# Patient Record
Sex: MALE | Race: WHITE | NOT HISPANIC OR LATINO | URBAN - METROPOLITAN AREA
[De-identification: names, ages, dates, MRNs, and addresses within clinical notes are randomized per-mention and may not be internally consistent; named-entity substitution may affect disease eponyms.]

---

## 2022-01-05 ENCOUNTER — OUT OF OFFICE VISIT (OUTPATIENT)
Dept: URBAN - METROPOLITAN AREA MEDICAL CENTER 23 | Facility: MEDICAL CENTER | Age: 67
End: 2022-01-05
Payer: MEDICARE

## 2022-01-05 DIAGNOSIS — R13.19 CERVICAL DYSPHAGIA: ICD-10-CM

## 2022-01-05 DIAGNOSIS — I10 ACCELERATED ESSENTIAL HYPERTENSION: ICD-10-CM

## 2022-01-05 PROCEDURE — 99232 SBSQ HOSP IP/OBS MODERATE 35: CPT | Performed by: PHYSICIAN ASSISTANT

## 2022-01-05 PROCEDURE — 99222 1ST HOSP IP/OBS MODERATE 55: CPT | Performed by: PHYSICIAN ASSISTANT

## 2022-01-05 PROCEDURE — G8427 DOCREV CUR MEDS BY ELIG CLIN: HCPCS | Performed by: PHYSICIAN ASSISTANT

## 2022-01-07 ENCOUNTER — OUT OF OFFICE VISIT (OUTPATIENT)
Dept: URBAN - METROPOLITAN AREA MEDICAL CENTER 23 | Facility: MEDICAL CENTER | Age: 67
End: 2022-01-07
Payer: MEDICARE

## 2022-01-07 DIAGNOSIS — R13.19 CERVICAL DYSPHAGIA: ICD-10-CM

## 2022-01-07 DIAGNOSIS — K22.2 ACQUIRED ESOPHAGEAL RING: ICD-10-CM

## 2022-01-07 PROCEDURE — 43246 EGD PLACE GASTROSTOMY TUBE: CPT | Performed by: INTERNAL MEDICINE

## 2022-01-18 ENCOUNTER — OUT OF OFFICE VISIT (OUTPATIENT)
Dept: URBAN - METROPOLITAN AREA MEDICAL CENTER 23 | Facility: MEDICAL CENTER | Age: 67
End: 2022-01-18
Payer: MEDICARE

## 2022-01-18 DIAGNOSIS — R11.2 ACUTE NAUSEA WITH NONBILIOUS VOMITING: ICD-10-CM

## 2022-01-18 DIAGNOSIS — R06.6 CHRONIC HICCOUGHS: ICD-10-CM

## 2022-01-18 PROCEDURE — 99214 OFFICE O/P EST MOD 30 MIN: CPT | Performed by: STUDENT IN AN ORGANIZED HEALTH CARE EDUCATION/TRAINING PROGRAM

## 2022-01-19 ENCOUNTER — OUT OF OFFICE VISIT (OUTPATIENT)
Dept: URBAN - METROPOLITAN AREA MEDICAL CENTER 23 | Facility: MEDICAL CENTER | Age: 67
End: 2022-01-19
Payer: MEDICARE

## 2022-01-19 DIAGNOSIS — R11.2 ACUTE NAUSEA WITH NONBILIOUS VOMITING: ICD-10-CM

## 2022-01-19 DIAGNOSIS — R13.19 CERVICAL DYSPHAGIA: ICD-10-CM

## 2022-01-19 PROCEDURE — 99214 OFFICE O/P EST MOD 30 MIN: CPT | Performed by: REGISTERED NURSE

## 2022-02-23 ENCOUNTER — OFFICE VISIT (OUTPATIENT)
Dept: URBAN - METROPOLITAN AREA CLINIC 54 | Facility: CLINIC | Age: 67
End: 2022-02-23
Payer: MEDICARE

## 2022-02-23 ENCOUNTER — WEB ENCOUNTER (OUTPATIENT)
Dept: URBAN - METROPOLITAN AREA CLINIC 54 | Facility: CLINIC | Age: 67
End: 2022-02-23

## 2022-02-23 VITALS
SYSTOLIC BLOOD PRESSURE: 141 MMHG | TEMPERATURE: 98 F | HEART RATE: 85 BPM | DIASTOLIC BLOOD PRESSURE: 83 MMHG | BODY MASS INDEX: 21.37 KG/M2 | HEIGHT: 72 IN | WEIGHT: 157.8 LBS

## 2022-02-23 DIAGNOSIS — R13.19 OTHER DYSPHAGIA: ICD-10-CM

## 2022-02-23 DIAGNOSIS — I63.81 ISCHEMIC CEREBRAL STROKE DUE TO SMALL ARTERY OCCLUSION: ICD-10-CM

## 2022-02-23 PROBLEM — 724424009: Status: ACTIVE | Noted: 2022-02-23

## 2022-02-23 PROCEDURE — 99203 OFFICE O/P NEW LOW 30 MIN: CPT

## 2022-02-23 RX ORDER — ATORVASTATIN CALCIUM, FILM COATED 40 MG/1
TABLET ORAL
Qty: 30 | Status: ACTIVE | COMMUNITY

## 2022-02-23 RX ORDER — AMLODIPINE BESYLATE 10 MG/1
TAKE 1 TABLET BY MOUTH EVERY DAY TABLET ORAL
Qty: 30 | Refills: 0 | Status: ACTIVE | COMMUNITY

## 2022-02-23 RX ORDER — METFORMIN HYDROCHLORIDE 500 MG/1
1 TABLET WITH A MEAL TABLET, FILM COATED ORAL ONCE A DAY
Status: ACTIVE | COMMUNITY

## 2022-02-23 RX ORDER — ASPIRIN 81 MG/1
1 TABLET TABLET, COATED ORAL ONCE A DAY
Status: ACTIVE | COMMUNITY

## 2022-02-23 NOTE — HPI-TODAY'S VISIT:
Pt is a 65 yo male s/p CVA who presents for hospital follow up. Pt presented to [facility] ED from OSH for headache and abnormal gait on 1/3/22. 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 Pt developed dysphagia so GI was consulted for PEG which was done on 1/7. Pt was tolerating tube feeding well until he developed n/v on 1/15, so PEG was converted to PEG-J by IR on 1/18. Pt has doing well since discharge 1 month ago. He is still completely  NPO per speech therapy's recommendation. Pt states he had barium swallow on Monday that was abnormal. Notes that he is better able to tolerate his saliva every day. Tolerating tube feedings well. Using pump with feedings running throughout the day. Denies nausea, vomiting, change in bowel habits or diarrhea. No pain at PEG site.

## 2022-02-23 NOTE — PHYSICAL EXAM GASTROINTESTINAL
Abdomen , PEG tube in place, clean site with small amount of granulation tissue noted, small umbilical hernia, soft, nontender, nondistended , no guarding or rigidity, normal bowel sounds , Liver and Spleen , no hepatomegaly present , no hepatosplenomegaly , liver nontender , spleen not palpable

## 2022-04-05 ENCOUNTER — DASHBOARD ENCOUNTERS (OUTPATIENT)
Age: 67
End: 2022-04-05

## 2022-04-05 ENCOUNTER — OFFICE VISIT (OUTPATIENT)
Dept: RURAL CLINIC 4 | Facility: CLINIC | Age: 67
End: 2022-04-05
Payer: MEDICARE

## 2022-04-05 DIAGNOSIS — R13.19 ESOPHAGEAL DYSPHAGIA: ICD-10-CM

## 2022-04-05 DIAGNOSIS — E78.2 MIXED HYPERLIPIDEMIA: ICD-10-CM

## 2022-04-05 DIAGNOSIS — R13.13 PHARYNGEAL DYSPHAGIA: ICD-10-CM

## 2022-04-05 DIAGNOSIS — Z43.1 ATTENTION TO GASTROSTOMY TUBE: ICD-10-CM

## 2022-04-05 DIAGNOSIS — E11.9 TYPE 2 DIABETES MELLITUS WITHOUT COMPLICATION, WITHOUT LONG-TERM CURRENT USE OF INSULIN: ICD-10-CM

## 2022-04-05 DIAGNOSIS — I63.9 CEREBROVASCULAR ACCIDENT (CVA), UNSPECIFIED MECHANISM: ICD-10-CM

## 2022-04-05 PROBLEM — 267434003: Status: ACTIVE | Noted: 2022-04-05

## 2022-04-05 PROBLEM — 21101000119105: Status: ACTIVE | Noted: 2022-04-05

## 2022-04-05 PROBLEM — 313436004: Status: ACTIVE | Noted: 2022-04-05

## 2022-04-05 PROBLEM — 305058001: Status: ACTIVE | Noted: 2022-04-05

## 2022-04-05 PROBLEM — 230690007: Status: ACTIVE | Noted: 2022-04-05

## 2022-04-05 PROCEDURE — 99214 OFFICE O/P EST MOD 30 MIN: CPT | Performed by: INTERNAL MEDICINE

## 2022-04-05 RX ORDER — ASPIRIN 81 MG/1
1 TABLET TABLET, COATED ORAL ONCE A DAY
Status: ACTIVE | COMMUNITY

## 2022-04-05 RX ORDER — ATORVASTATIN CALCIUM, FILM COATED 40 MG/1
TABLET ORAL
Qty: 30 | Status: ACTIVE | COMMUNITY

## 2022-04-05 RX ORDER — AMLODIPINE BESYLATE 10 MG/1
TAKE 1 TABLET BY MOUTH EVERY DAY TABLET ORAL
Qty: 30 | Refills: 0 | Status: ACTIVE | COMMUNITY

## 2022-04-05 RX ORDER — METFORMIN HYDROCHLORIDE 500 MG/1
1 TABLET WITH A MEAL TABLET, FILM COATED ORAL ONCE A DAY
Status: ACTIVE | COMMUNITY

## 2022-04-05 NOTE — HPI-TODAY'S VISIT:
Pt is a 67 yo male s/p CVA who presents for hospital follow up. Pt presented to [facility] ED from OSH for headache and abnormal gait on 1/3/22. 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 Pt developed dysphagia so GI was consulted for PEG which was done on 1/7. Pt was tolerating tube feeding well until he developed n/v on 1/15, so PEG was converted to PEG-J by IR on 1/18. Pt has doing well since discharge 1 month ago. He is still completely  NPO per speech therapy's recommendation. Pt states he had barium swallow on Monday that was abnormal. Notes that he is better able to tolerate his saliva every day. Tolerating tube feedings well. Using pump with feedings running throughout the day. Denies nausea, vomiting, change in bowel habits or diarrhea. No pain at PEG site. - 4/5/2022: pt tells me that he had a stroke in January. has been taking issues with swallowing since then. apparently had a barium swallow last week but of course, it was not sent to me to review.
Yes

## 2022-04-05 NOTE — PHYSICAL EXAM GASTROINTESTINAL
Abdomen , soft, nontender, nondistended , no guarding or rigidity , no masses palpable , normal bowel sounds , Liver and Spleen , no hepatomegaly present , no hepatosplenomegaly , liver nontender , spleen not palpable  -  there is a gastrostomy tube present in the left upper quadrant with no surrounding erythema or fluctuance.

## 2022-04-30 PROBLEM — 40739000: Status: ACTIVE | Noted: 2022-02-23

## 2022-05-19 ENCOUNTER — OFFICE VISIT (OUTPATIENT)
Dept: RURAL MEDICAL CENTER 2 | Facility: MEDICAL CENTER | Age: 67
End: 2022-05-19

## 2022-06-02 ENCOUNTER — OFFICE VISIT (OUTPATIENT)
Dept: RURAL CLINIC 2 | Facility: CLINIC | Age: 67
End: 2022-06-02

## 2022-08-10 ENCOUNTER — OFFICE VISIT (OUTPATIENT)
Dept: URBAN - METROPOLITAN AREA CLINIC 54 | Facility: CLINIC | Age: 67
End: 2022-08-10

## 2022-08-10 NOTE — HPI-TODAY'S VISIT:
Pt is a 65 yo male s/p CVA who presents for hospital follow up. Pt presented to [facility] ED from OSH for headache and abnormal gait on 1/3/22. 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 Pt developed dysphagia so GI was consulted for PEG which was done on 1/7. Pt was tolerating tube feeding well until he developed n/v on 1/15, so PEG was converted to PEG-J by IR on 1/18. Pt has doing well since discharge 1 month ago. He is still completely  NPO per speech therapy's recommendation. Pt states he had barium swallow on Monday that was abnormal. Notes that he is better able to tolerate his saliva every day. Tolerating tube feedings well. Using pump with feedings running throughout the day. Denies nausea, vomiting, change in bowel habits or diarrhea. No pain at PEG site. - 05/04/2022: